# Patient Record
Sex: MALE | Race: WHITE | URBAN - METROPOLITAN AREA
[De-identification: names, ages, dates, MRNs, and addresses within clinical notes are randomized per-mention and may not be internally consistent; named-entity substitution may affect disease eponyms.]

---

## 2018-11-19 ENCOUNTER — OFFICE VISIT (OUTPATIENT)
Dept: FAMILY MEDICINE CLINIC | Facility: CLINIC | Age: 28
End: 2018-11-19
Payer: COMMERCIAL

## 2018-11-19 VITALS
DIASTOLIC BLOOD PRESSURE: 68 MMHG | OXYGEN SATURATION: 94 % | WEIGHT: 315 LBS | TEMPERATURE: 98 F | RESPIRATION RATE: 22 BRPM | SYSTOLIC BLOOD PRESSURE: 126 MMHG | BODY MASS INDEX: 49.44 KG/M2 | HEIGHT: 67 IN | HEART RATE: 93 BPM

## 2018-11-19 DIAGNOSIS — E66.01 CLASS 3 SEVERE OBESITY DUE TO EXCESS CALORIES WITH BODY MASS INDEX (BMI) OF 60.0 TO 69.9 IN ADULT, UNSPECIFIED WHETHER SERIOUS COMORBIDITY PRESENT (HCC): ICD-10-CM

## 2018-11-19 DIAGNOSIS — R00.0 TACHYCARDIA: ICD-10-CM

## 2018-11-19 DIAGNOSIS — Z00.00 ANNUAL PHYSICAL EXAM: Primary | ICD-10-CM

## 2018-11-19 DIAGNOSIS — E66.2 OBESITY HYPOVENTILATION SYNDROME (HCC): ICD-10-CM

## 2018-11-19 DIAGNOSIS — R35.1 NOCTURIA: ICD-10-CM

## 2018-11-19 PROBLEM — J30.9 ALLERGIC RHINITIS: Status: ACTIVE | Noted: 2018-11-19

## 2018-11-19 PROCEDURE — 93000 ELECTROCARDIOGRAM COMPLETE: CPT | Performed by: FAMILY MEDICINE

## 2018-11-19 PROCEDURE — 99212 OFFICE O/P EST SF 10 MIN: CPT | Performed by: FAMILY MEDICINE

## 2018-11-19 PROCEDURE — 99385 PREV VISIT NEW AGE 18-39: CPT | Performed by: FAMILY MEDICINE

## 2018-11-19 NOTE — PROGRESS NOTES
[facility] [place]  PROGRESS NOTE  Chief Complaint:   Patient presents with:  Establish Care  Physical      HPI:   This is a 33year old male coming in for Pt needing doctor.  Not seen doctor in 4  Years    Pt her for general check  Pt with sleep file    Social History Narrative      Not on file    Family History:  History reviewed. No pertinent family history. Allergies:  No Known Allergies  Current Meds:    No current outpatient medications on file.    Counseling given: Not Answered       REVIEW well developed, well nourished, no apparent distress.  Audible breathing- often oral, morbid obesity  HEENT:  Head:  Normocephalic, atraumatic Eyes: EOMI, PERRLA, no scleral icterus, conjunctivae clear bilaterally, no eye discharge Ears: External normal. No Future  - MAGNESIUM;  Future  - HEMOGLOBIN A1C; Future  - TSH W REFLEX TO FREE T4; Future  - URINALYSIS WITH CULTURE REFLEX; Future  - VITAMIN D, 25-HYDROXY; Future  - ELECTROCARDIOGRAM, COMPLETE  - VITAMIN B12; Future  - SLEEP MEDICINE - INTERNAL    3. Tac notified to call with any questions, complications, allergies, or worsening or changing symptoms. Patient is to call with any side effects or complications from the treatments as a result of today.

## 2018-11-19 NOTE — PATIENT INSTRUCTIONS
rec fasting labs-can be scheduled here    Sleep eval- can be scheduled here    F.u pending lab results    consider bariatric referral once lab results reviewed    EKG-normal-- ok to walk, exercise as tolerates

## 2018-11-20 ENCOUNTER — OFFICE VISIT (OUTPATIENT)
Dept: FAMILY MEDICINE CLINIC | Facility: CLINIC | Age: 28
End: 2018-11-20
Payer: COMMERCIAL

## 2018-11-20 VITALS
TEMPERATURE: 99 F | BODY MASS INDEX: 49.44 KG/M2 | WEIGHT: 315 LBS | HEIGHT: 66.75 IN | HEART RATE: 102 BPM | DIASTOLIC BLOOD PRESSURE: 78 MMHG | SYSTOLIC BLOOD PRESSURE: 142 MMHG | RESPIRATION RATE: 24 BRPM

## 2018-11-20 DIAGNOSIS — E66.01 CLASS 3 SEVERE OBESITY DUE TO EXCESS CALORIES WITH BODY MASS INDEX (BMI) OF 60.0 TO 69.9 IN ADULT, UNSPECIFIED WHETHER SERIOUS COMORBIDITY PRESENT (HCC): ICD-10-CM

## 2018-11-20 DIAGNOSIS — G47.10 HYPERSOMNIA: Primary | ICD-10-CM

## 2018-11-20 DIAGNOSIS — R06.83 SNORING: ICD-10-CM

## 2018-11-20 DIAGNOSIS — E66.2 OBESITY HYPOVENTILATION SYNDROME (HCC): ICD-10-CM

## 2018-11-20 PROCEDURE — 99244 OFF/OP CNSLTJ NEW/EST MOD 40: CPT | Performed by: FAMILY MEDICINE

## 2018-11-20 NOTE — PROGRESS NOTES
[facility] [place]  SLEEP PROGRESS NOTE        HPI:   This is a 33year old male coming in for Patient presents with:  Consult: TODD      HPI:  Here for sleep consult at the request of Dr. Ephraim Holliday.      He has trouble with getting to sleep after wor History Narrative      Not on file    Social History    Tobacco Use      Smoking status: Never Smoker      Smokeless tobacco: Never Used    Alcohol use: Yes      Frequency: 2-3 times a week      Comment: Social    Drug use: No    Family History:  No family Pupils are equal, round, and reactive to light. Neck: Normal range of motion. Neck supple. No tracheal deviation present. No thyromegaly present.    MAL 4  Tonsils 0    Cardiovascular: Normal rate, regular rhythm, normal heart sounds and intact distal pul complications from the treatments as a result of today.        Christi Núñez MD  11/20/2018  11:04 AM

## 2018-11-20 NOTE — PATIENT INSTRUCTIONS
If you have not heard from [facility] sleep [facility] within the next 1 week please call the Sleep Lab at  [phone] or our office to help with scheduling. You should have heard from our precert department within the next 3-5 days.   IF you have not hea

## 2018-11-24 ENCOUNTER — LABORATORY ENCOUNTER (OUTPATIENT)
Dept: LAB | Age: 28
End: 2018-11-24
Attending: FAMILY MEDICINE
Payer: COMMERCIAL

## 2018-11-24 DIAGNOSIS — E66.2 OBESITY HYPOVENTILATION SYNDROME (HCC): ICD-10-CM

## 2018-11-24 DIAGNOSIS — Z00.00 ANNUAL PHYSICAL EXAM: ICD-10-CM

## 2018-11-24 DIAGNOSIS — R35.1 NOCTURIA: ICD-10-CM

## 2018-11-24 DIAGNOSIS — R00.0 TACHYCARDIA: ICD-10-CM

## 2018-11-24 DIAGNOSIS — E66.01 CLASS 3 SEVERE OBESITY DUE TO EXCESS CALORIES WITH BODY MASS INDEX (BMI) OF 60.0 TO 69.9 IN ADULT, UNSPECIFIED WHETHER SERIOUS COMORBIDITY PRESENT (HCC): ICD-10-CM

## 2018-11-24 PROCEDURE — 80061 LIPID PANEL: CPT | Performed by: FAMILY MEDICINE

## 2018-11-24 PROCEDURE — 82306 VITAMIN D 25 HYDROXY: CPT | Performed by: FAMILY MEDICINE

## 2018-11-24 PROCEDURE — 36415 COLL VENOUS BLD VENIPUNCTURE: CPT | Performed by: FAMILY MEDICINE

## 2018-11-24 PROCEDURE — 83735 ASSAY OF MAGNESIUM: CPT | Performed by: FAMILY MEDICINE

## 2018-11-24 PROCEDURE — 83036 HEMOGLOBIN GLYCOSYLATED A1C: CPT | Performed by: FAMILY MEDICINE

## 2018-11-24 PROCEDURE — 80050 GENERAL HEALTH PANEL: CPT | Performed by: FAMILY MEDICINE

## 2018-11-24 PROCEDURE — 82607 VITAMIN B-12: CPT | Performed by: FAMILY MEDICINE

## 2018-11-24 PROCEDURE — 87147 CULTURE TYPE IMMUNOLOGIC: CPT | Performed by: FAMILY MEDICINE

## 2018-11-24 PROCEDURE — 87086 URINE CULTURE/COLONY COUNT: CPT | Performed by: FAMILY MEDICINE

## 2018-11-24 PROCEDURE — 81001 URINALYSIS AUTO W/SCOPE: CPT | Performed by: FAMILY MEDICINE

## 2018-11-27 ENCOUNTER — TELEPHONE (OUTPATIENT)
Dept: FAMILY MEDICINE CLINIC | Facility: CLINIC | Age: 28
End: 2018-11-27

## 2018-11-27 DIAGNOSIS — E74.39 GLUCOSE INTOLERANCE: ICD-10-CM

## 2018-11-27 DIAGNOSIS — E55.9 VITAMIN D DEFICIENCY: Primary | ICD-10-CM

## 2018-11-27 RX ORDER — CEPHALEXIN 500 MG/1
500 CAPSULE ORAL 3 TIMES DAILY
Qty: 15 CAPSULE | Refills: 0 | Status: SHIPPED | OUTPATIENT
Start: 2018-11-27 | End: 2018-12-02

## 2018-11-27 RX ORDER — CHOLECALCIFEROL (VITAMIN D3) 125 MCG
500 CAPSULE ORAL DAILY
COMMUNITY

## 2018-11-27 RX ORDER — ERGOCALCIFEROL 1.25 MG/1
50000 CAPSULE ORAL WEEKLY
Qty: 12 CAPSULE | Refills: 0 | Status: SHIPPED | OUTPATIENT
Start: 2018-11-27 | End: 2019-02-13

## 2018-11-27 NOTE — TELEPHONE ENCOUNTER
----- Message from Devorah Lucero MD sent at 11/27/2018  9:37 AM CST -----  Laboratory results reviewed. Urinalysis mild abnormality culture with group B strep I suspect is contamination. Due to large amount of skin cells found in the urinalysis.   Ana Luisa Hernandez

## 2018-11-27 NOTE — TELEPHONE ENCOUNTER
Pt informed, all instructions given. Pt states he doesn't have pain or burning with urination, but does have frequency -  More frequency during the day and night (up 2-3 times at night)- has noticed s/s last 2-3 months.

## 2018-12-28 ENCOUNTER — TELEPHONE (OUTPATIENT)
Dept: FAMILY MEDICINE CLINIC | Facility: CLINIC | Age: 28
End: 2018-12-28

## 2019-01-15 ENCOUNTER — SLEEP STUDY (OUTPATIENT)
Dept: FAMILY MEDICINE CLINIC | Facility: CLINIC | Age: 29
End: 2019-01-15
Payer: COMMERCIAL

## 2019-01-15 DIAGNOSIS — G47.33 OBSTRUCTIVE SLEEP APNEA: Primary | ICD-10-CM

## 2019-01-15 PROCEDURE — 95811 POLYSOM 6/>YRS CPAP 4/> PARM: CPT | Performed by: FAMILY MEDICINE

## 2019-03-15 ENCOUNTER — TELEPHONE (OUTPATIENT)
Dept: FAMILY MEDICINE CLINIC | Facility: CLINIC | Age: 29
End: 2019-03-15

## 2019-03-15 NOTE — TELEPHONE ENCOUNTER
Patient had split night sleep study done at [facility] on 1/12/19  Patient was supposed to get set up with equipment through [facility]  Calling to follow up with patient    Left message for patient to call office.     Rosemary Mcghee, 03/15/19, 4:09 PM

## 2019-04-17 ENCOUNTER — TELEPHONE (OUTPATIENT)
Dept: FAMILY MEDICINE CLINIC | Facility: CLINIC | Age: 29
End: 2019-04-17

## 2019-04-17 NOTE — TELEPHONE ENCOUNTER
Noted that patient received his CPAP machine on 1/25/2019. LM for patient to return call to schedule a f/u appt.

## 2019-05-29 ENCOUNTER — TELEPHONE (OUTPATIENT)
Dept: FAMILY MEDICINE CLINIC | Facility: CLINIC | Age: 29
End: 2019-05-29

## 2019-05-29 NOTE — TELEPHONE ENCOUNTER
Provider states patient got denied from insurance for sleep study, requesting prior office notes to send them to insurance.  Fax [phone]

## 2019-06-03 NOTE — TELEPHONE ENCOUNTER
Contacted Elizabeth thru [facility]/referral, stated that sleep study denied and requesting OV notes and documentation to support claim for approval.   Sleep study, sleepy scale and OV notes faxed.

## (undated) NOTE — LETTER
05/01/19        Aayush Laura  [address] [place],[address]      Dear Debbie Mckeon,    We received noticed that you had been seen up with your CPAP machine in January.   We have been trying to reach you to schedule a follow-up appointm